# Patient Record
Sex: MALE | URBAN - NONMETROPOLITAN AREA
[De-identification: names, ages, dates, MRNs, and addresses within clinical notes are randomized per-mention and may not be internally consistent; named-entity substitution may affect disease eponyms.]

---

## 2022-11-07 ENCOUNTER — APPOINTMENT (OUTPATIENT)
Dept: URBAN - NONMETROPOLITAN AREA CLINIC 39 | Age: 77
Setting detail: DERMATOLOGY
End: 2022-11-07

## 2022-11-07 DIAGNOSIS — L30.4 ERYTHEMA INTERTRIGO: ICD-10-CM

## 2022-11-07 PROCEDURE — OTHER ADDITIONAL NOTES: OTHER

## 2022-11-07 PROCEDURE — OTHER PRESCRIPTION: OTHER

## 2022-11-07 PROCEDURE — OTHER MEDICATION COUNSELING: OTHER

## 2022-11-07 PROCEDURE — OTHER MIPS QUALITY: OTHER

## 2022-11-07 PROCEDURE — 99203 OFFICE O/P NEW LOW 30 MIN: CPT

## 2022-11-07 PROCEDURE — OTHER COUNSELING: OTHER

## 2022-11-07 RX ORDER — FLUCONAZOLE 200 MG/1
TABLET ORAL
Qty: 1 | Refills: 0 | Status: ERX | COMMUNITY
Start: 2022-11-07

## 2022-11-07 RX ORDER — KETOCONAZOLE 20 MG/G
CREAM TOPICAL
Qty: 60 | Refills: 2 | Status: ERX | COMMUNITY
Start: 2022-11-07

## 2022-11-07 RX ORDER — NYSTATIN 100000 [USP'U]/G
POWDER TOPICAL BID
Qty: 120 | Refills: 4 | Status: ERX | COMMUNITY
Start: 2022-11-07

## 2022-11-07 ASSESSMENT — LOCATION ZONE DERM: LOCATION ZONE: TRUNK

## 2022-11-07 ASSESSMENT — SEVERITY ASSESSMENT: SEVERITY: MODERATE

## 2022-11-07 ASSESSMENT — LOCATION DETAILED DESCRIPTION DERM: LOCATION DETAILED: GLUTEAL CLEFT

## 2022-11-07 ASSESSMENT — LOCATION SIMPLE DESCRIPTION DERM: LOCATION SIMPLE: GLUTEAL CLEFT

## 2022-11-07 ASSESSMENT — BSA RASH: BSA RASH: 2

## 2022-11-07 NOTE — HPI: RASH
What Type Of Note Output Would You Prefer (Optional)?: Standard Output
How Severe Is Your Rash?: mild
Is This A New Presentation, Or A Follow-Up?: Referral for Rash
Additional History: Improving with Triamcinolone 0.025%  uses TIW

## 2022-11-07 NOTE — PROCEDURE: ADDITIONAL NOTES
Detail Level: Detailed
Render Risk Assessment In Note?: no
Additional Notes: Favor intertrigo with yeast component due to onset of 2mths and no prior hx. To consider inverse psoriasis pending progress and if not resolved or resolving at follow up t/c biopsy. No hx of Pso, hx of \"Rash\" on anticubital folds and left chest that resolved with Triamcinolone 0.25% from PCP. Discussed gentle skin care, loose cotton shorts and samples of gentle laundry detergent given.

## 2022-12-09 ENCOUNTER — APPOINTMENT (OUTPATIENT)
Dept: URBAN - NONMETROPOLITAN AREA CLINIC 39 | Age: 77
Setting detail: DERMATOLOGY
End: 2022-12-13

## 2022-12-09 DIAGNOSIS — L30.4 ERYTHEMA INTERTRIGO: ICD-10-CM

## 2022-12-09 DIAGNOSIS — L259 CONTACT DERMATITIS AND OTHER ECZEMA, UNSPECIFIED CAUSE: ICD-10-CM

## 2022-12-09 DIAGNOSIS — L85.3 XEROSIS CUTIS: ICD-10-CM

## 2022-12-09 PROBLEM — L23.9 ALLERGIC CONTACT DERMATITIS, UNSPECIFIED CAUSE: Status: ACTIVE | Noted: 2022-12-09

## 2022-12-09 PROCEDURE — OTHER TREATMENT REGIMEN: OTHER

## 2022-12-09 PROCEDURE — 99213 OFFICE O/P EST LOW 20 MIN: CPT

## 2022-12-09 PROCEDURE — OTHER PRESCRIPTION: OTHER

## 2022-12-09 PROCEDURE — OTHER MIPS QUALITY: OTHER

## 2022-12-09 PROCEDURE — OTHER COUNSELING: OTHER

## 2022-12-09 PROCEDURE — OTHER OTC TREATMENT REGIMEN: OTHER

## 2022-12-09 PROCEDURE — OTHER MEDICATION COUNSELING: OTHER

## 2022-12-09 RX ORDER — CLOBETASOL PROPIONATE 0.5 MG/G
CREAM TOPICAL BID
Qty: 45 | Refills: 0 | Status: ERX | COMMUNITY
Start: 2022-12-09

## 2022-12-09 RX ORDER — HYDROCORTISONE 25 MG/G
CREAM TOPICAL
Qty: 30 | Refills: 2 | Status: ERX | COMMUNITY
Start: 2022-12-09

## 2022-12-09 ASSESSMENT — SEVERITY ASSESSMENT: SEVERITY: MILD TO MODERATE

## 2022-12-09 ASSESSMENT — LOCATION DETAILED DESCRIPTION DERM
LOCATION DETAILED: STERNUM
LOCATION DETAILED: GLUTEAL CLEFT
LOCATION DETAILED: RIGHT VENTRAL DISTAL FOREARM

## 2022-12-09 ASSESSMENT — LOCATION ZONE DERM
LOCATION ZONE: ARM
LOCATION ZONE: TRUNK

## 2022-12-09 ASSESSMENT — LOCATION SIMPLE DESCRIPTION DERM
LOCATION SIMPLE: GLUTEAL CLEFT
LOCATION SIMPLE: RIGHT FOREARM
LOCATION SIMPLE: CHEST

## 2022-12-09 ASSESSMENT — BSA RASH: BSA RASH: 1

## 2022-12-09 NOTE — PROCEDURE: TREATMENT REGIMEN
Plan: Improved but not at tx goal, will at Hydrocortisone cream BID x 2 weeks, mix with ketoconazole cream. Contact office if worsening
Continue Regimen: Nystatin tiw\\nKetoconazole cream as needed
Detail Level: Zone

## 2022-12-09 NOTE — PROCEDURE: OTC TREATMENT REGIMEN
Detail Level: Zone
Patient Specific Otc Recommendations (Will Not Stick From Patient To Patient): Soap and moisturizer handout given

## 2024-06-25 ENCOUNTER — APPOINTMENT (OUTPATIENT)
Dept: URBAN - NONMETROPOLITAN AREA CLINIC 39 | Age: 79
Setting detail: DERMATOLOGY
End: 2024-06-26

## 2024-06-25 DIAGNOSIS — L30.0 NUMMULAR DERMATITIS: ICD-10-CM

## 2024-06-25 DIAGNOSIS — R21 RASH AND OTHER NONSPECIFIC SKIN ERUPTION: ICD-10-CM

## 2024-06-25 PROCEDURE — OTHER PRESCRIPTION: OTHER

## 2024-06-25 PROCEDURE — OTHER BIOPSY BY PUNCH METHOD: OTHER

## 2024-06-25 PROCEDURE — 99214 OFFICE O/P EST MOD 30 MIN: CPT | Mod: 25

## 2024-06-25 PROCEDURE — 11104 PUNCH BX SKIN SINGLE LESION: CPT

## 2024-06-25 PROCEDURE — OTHER COUNSELING: OTHER

## 2024-06-25 RX ORDER — CLOBETASOL PROPIONATE 0.5 MG/G
CREAM TOPICAL
Qty: 120 | Refills: 0 | Status: ERX

## 2024-06-25 ASSESSMENT — LOCATION SIMPLE DESCRIPTION DERM
LOCATION SIMPLE: RIGHT UPPER ARM
LOCATION SIMPLE: LEFT UPPER ARM
LOCATION SIMPLE: RIGHT UPPER BACK

## 2024-06-25 ASSESSMENT — LOCATION DETAILED DESCRIPTION DERM
LOCATION DETAILED: RIGHT LATERAL UPPER BACK
LOCATION DETAILED: LEFT PROXIMAL POSTERIOR UPPER ARM
LOCATION DETAILED: LEFT ANTERIOR PROXIMAL UPPER ARM
LOCATION DETAILED: RIGHT PROXIMAL POSTERIOR UPPER ARM

## 2024-06-25 ASSESSMENT — LOCATION ZONE DERM
LOCATION ZONE: TRUNK
LOCATION ZONE: ARM

## 2024-06-25 ASSESSMENT — BSA RASH: BSA RASH: 10

## 2024-06-25 ASSESSMENT — SEVERITY ASSESSMENT: SEVERITY: MODERATE

## 2024-06-25 NOTE — PROCEDURE: BIOPSY BY PUNCH METHOD
Detail Level: Detailed
Was A Bandage Applied: Yes
Punch Size In Mm: 3.5
Size Of Lesion In Cm (Optional): 0
Depth Of Punch Biopsy: dermis
Biopsy Type: H and E
Anesthesia Type: 1% lidocaine with epinephrine
Anesthesia Volume In Cc: 0.5
Hemostasis: Zuri's
Epidermal Sutures: 4-0 Nylon
Wound Care: Petrolatum
Dressing: pressure dressing
Suture Removal: 12 days
Patient Will Remove Sutures At Home?: No
Lab: [id number]
Consent: Written consent was obtained and risks were reviewed including but not limited to scarring, infection, bleeding, scabbing, incomplete removal, nerve damage and allergy to anesthesia.
Post-Care Instructions: I reviewed with the patient in detail post-care instructions. Patient is to keep the biopsy site dry overnight, and then apply bacitracin twice daily until healed. Patient may apply hydrogen peroxide soaks to remove any crusting.
Home Suture Removal Text: Patient was provided a home suture removal kit and will remove their sutures at home.  If they have any questions or difficulties they will call the office.
Notification Instructions: Patient will be notified of biopsy results. However, patient instructed to call the office if not contacted within 2 weeks.
Billing Type: Third-Party Bill
Information: Selecting Yes will display possible errors in your note based on the variables you have selected. This validation is only offered as a suggestion for you. PLEASE NOTE THAT THE VALIDATION TEXT WILL BE REMOVED WHEN YOU FINALIZE YOUR NOTE. IF YOU WANT TO FAX A PRELIMINARY NOTE YOU WILL NEED TO TOGGLE THIS TO 'NO' IF YOU DO NOT WANT IT IN YOUR FAXED NOTE.

## 2024-07-16 ENCOUNTER — APPOINTMENT (OUTPATIENT)
Dept: URBAN - NONMETROPOLITAN AREA CLINIC 39 | Age: 79
Setting detail: DERMATOLOGY
End: 2024-07-16

## 2024-07-16 DIAGNOSIS — L259 CONTACT DERMATITIS AND OTHER ECZEMA, UNSPECIFIED CAUSE: ICD-10-CM

## 2024-07-16 DIAGNOSIS — Z48.02 ENCOUNTER FOR REMOVAL OF SUTURES: ICD-10-CM

## 2024-07-16 PROBLEM — L30.8 OTHER SPECIFIED DERMATITIS: Status: ACTIVE | Noted: 2024-07-16

## 2024-07-16 PROCEDURE — 99213 OFFICE O/P EST LOW 20 MIN: CPT

## 2024-07-16 PROCEDURE — OTHER SUTURE REMOVAL (GLOBAL PERIOD): OTHER

## 2024-07-16 PROCEDURE — OTHER ADDITIONAL NOTES: OTHER

## 2024-07-16 PROCEDURE — OTHER MIPS QUALITY: OTHER

## 2024-07-16 PROCEDURE — OTHER COUNSELING: OTHER

## 2024-07-16 PROCEDURE — OTHER GENTLE SKIN CARE INSTRUCTIONS: OTHER

## 2024-07-16 ASSESSMENT — LOCATION SIMPLE DESCRIPTION DERM
LOCATION SIMPLE: LEFT UPPER ARM
LOCATION SIMPLE: RIGHT UPPER BACK
LOCATION SIMPLE: RIGHT UPPER ARM

## 2024-07-16 ASSESSMENT — LOCATION ZONE DERM
LOCATION ZONE: TRUNK
LOCATION ZONE: ARM

## 2024-07-16 ASSESSMENT — LOCATION DETAILED DESCRIPTION DERM
LOCATION DETAILED: RIGHT MID-UPPER BACK
LOCATION DETAILED: LEFT DISTAL POSTERIOR UPPER ARM
LOCATION DETAILED: RIGHT LATERAL UPPER BACK
LOCATION DETAILED: RIGHT DISTAL POSTERIOR UPPER ARM

## 2024-07-16 ASSESSMENT — BSA RASH: BSA RASH: 2

## 2024-07-16 ASSESSMENT — SEVERITY ASSESSMENT 2020: SEVERITY 2020: ALMOST CLEAR

## 2024-07-16 NOTE — PROCEDURE: ADDITIONAL NOTES
Render Risk Assessment In Note?: no
Detail Level: Simple
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 Significant improvement as of today’s visit and nearly resolved. Plan to refer to Dr. Sommer for patch testing with recurrence.

## 2024-07-16 NOTE — PROCEDURE: SUTURE REMOVAL (GLOBAL PERIOD)
Detail Level: Detailed
Add 88417 Cpt? (Important Note: In 2017 The Use Of 25016 Is Being Tracked By Cms To Determine Future Global Period Reimbursement For Global Periods): no

## 2024-08-09 NOTE — PROCEDURE: MEDICATION COUNSELING
Out to CT   Topical Sulfur Applications Counseling: Topical Sulfur Counseling: Patient counseled that this medication may cause skin irritation or allergic reactions.  In the event of skin irritation, the patient was advised to reduce the amount of the drug applied or use it less frequently.   The patient verbalized understanding of the proper use and possible adverse effects of topical sulfur application.  All of the patient's questions and concerns were addressed.
